# Patient Record
Sex: MALE | Race: WHITE
[De-identification: names, ages, dates, MRNs, and addresses within clinical notes are randomized per-mention and may not be internally consistent; named-entity substitution may affect disease eponyms.]

---

## 2019-01-20 ENCOUNTER — HOSPITAL ENCOUNTER (EMERGENCY)
Dept: HOSPITAL 33 - ED | Age: 24
Discharge: ANOTHER HOSPITAL | End: 2019-01-20
Payer: MEDICAID

## 2019-01-20 VITALS — OXYGEN SATURATION: 98 % | HEART RATE: 98 BPM | DIASTOLIC BLOOD PRESSURE: 69 MMHG | SYSTOLIC BLOOD PRESSURE: 99 MMHG

## 2019-01-20 DIAGNOSIS — J39.0: ICD-10-CM

## 2019-01-20 DIAGNOSIS — K04.7: Primary | ICD-10-CM

## 2019-01-20 DIAGNOSIS — L03.211: ICD-10-CM

## 2019-01-20 DIAGNOSIS — Z72.0: ICD-10-CM

## 2019-01-20 LAB
ALBUMIN SERPL-MCNC: 4.1 G/DL (ref 3.5–5)
ALP SERPL-CCNC: 124 U/L (ref 38–126)
ALT SERPL-CCNC: 35 U/L (ref 0–50)
ANION GAP SERPL CALC-SCNC: 13.6 MEQ/L (ref 5–15)
AST SERPL QL: 29 U/L (ref 17–59)
BASOPHILS # BLD AUTO: 0.02 10*3/UL (ref 0–0.4)
BASOPHILS NFR BLD AUTO: 0.2 % (ref 0–0.4)
BILIRUB BLD-MCNC: 0.8 MG/DL (ref 0.2–1.3)
BUN SERPL-MCNC: 23 MG/DL (ref 9–20)
CALCIUM SPEC-MCNC: 9.3 MG/DL (ref 8.4–10.2)
CHLORIDE SERPL-SCNC: 104 MMOL/L (ref 98–107)
CO2 SERPL-SCNC: 26 MMOL/L (ref 22–30)
CREAT SERPL-MCNC: 0.76 MG/DL (ref 0.66–1.25)
EOSINOPHIL # BLD AUTO: 0.05 10*3/UL (ref 0–0.5)
GLUCOSE SERPL-MCNC: 110 MG/DL (ref 74–106)
HCT VFR BLD AUTO: 45.8 % (ref 42–50)
HGB BLD-MCNC: 15 GM/DL (ref 12.5–18)
LYMPHOCYTES # SPEC AUTO: 1.28 10*3/UL (ref 1–4.6)
MCH RBC QN AUTO: 30.9 PG (ref 26–32)
MCHC RBC AUTO-ENTMCNC: 32.8 G/DL (ref 32–36)
MONOCYTES # BLD AUTO: 1.44 10*3/UL (ref 0–1.3)
NEUTROPHILS NFR BLD AUTO: 78.2 % (ref 36–66)
PLATELET # BLD AUTO: 214 K/MM3 (ref 150–450)
POTASSIUM SERPLBLD-SCNC: 4 MMOL/L (ref 3.5–5.1)
PROT SERPL-MCNC: 7.1 G/DL (ref 6.3–8.2)
RBC # BLD AUTO: 4.86 M/MM3 (ref 4.1–5.6)
SODIUM SERPL-SCNC: 139 MMOL/L (ref 137–145)
WBC # BLD AUTO: 12.9 K/MM3 (ref 4–10.5)

## 2019-01-20 PROCEDURE — 96360 HYDRATION IV INFUSION INIT: CPT

## 2019-01-20 PROCEDURE — 99285 EMERGENCY DEPT VISIT HI MDM: CPT

## 2019-01-20 PROCEDURE — 70486 CT MAXILLOFACIAL W/O DYE: CPT

## 2019-01-20 PROCEDURE — 36000 PLACE NEEDLE IN VEIN: CPT

## 2019-01-20 PROCEDURE — 85025 COMPLETE CBC W/AUTO DIFF WBC: CPT

## 2019-01-20 PROCEDURE — 96367 TX/PROPH/DG ADDL SEQ IV INF: CPT

## 2019-01-20 PROCEDURE — 96374 THER/PROPH/DIAG INJ IV PUSH: CPT

## 2019-01-20 PROCEDURE — 36415 COLL VENOUS BLD VENIPUNCTURE: CPT

## 2019-01-20 PROCEDURE — 80053 COMPREHEN METABOLIC PANEL: CPT

## 2019-01-20 PROCEDURE — 70490 CT SOFT TISSUE NECK W/O DYE: CPT

## 2019-01-20 PROCEDURE — 96365 THER/PROPH/DIAG IV INF INIT: CPT

## 2019-01-20 PROCEDURE — 96375 TX/PRO/DX INJ NEW DRUG ADDON: CPT

## 2019-01-20 NOTE — XRAY
Indication: Mouth pain and swelling.  Possible dental abscess.



Multiple contiguous axial images obtained through the facial bones without

contrast as ordered.  Sagittal and coronal reformatted images obtained.

Cutaneous marker placed over the region of interest.



Comparison: None



Cutaneous marker seen overlying right jaw where there is underlying soft

tissue swelling/edema favoring underlying inflammatory/infectious process.

Larger focus of deep right masseter space, floor of the right mouth, and right

paraesophageal soft tissue swelling/edema narrows the laryngopharynx and

oropharynx.  Lack of IV contrast precludes further characterization and

therefore abscesses not completely excluded.  Underlying prominent reactive

right submandibular and right cervical lymphadenopathy, largest 1.1 x 2.2 cm

adjacent to the carotid bulb.  Also enlarged right submandibular gland.  No

focal fluid collection or air bubbles to suggest abscess.



Elsewhere no acute fracture, suspicious bony lesions, or osseous destructive

process.  Incidental multiple bilateral dental caries, largest focus right

lower molar tooth.  Orbits including roof, walls, and floors are intact.

Floor of the maxillary sinuses demonstrates minimal mucosal thickening without

fluid leveling.  Moderate nasal septal deviation to the left.  Remaining

visualized noncontrasted soft tissues including base of the brain

unremarkable.  Visualized cervical spine intact.



Impression:

1.  Right mandible, right deep masseter space, floor of the right mouth, and

right paraesophageal soft tissue swelling/edema favoring underlying

inflammatory/infectious process with reactive submandibular and cervical

lymphadenopathy.  Abscesses not completely excluded on this noncontrast exam.

2.  Multiple bilateral dental caries.

3.  Minimal paranasal sinus disease.



Comment: Preliminary interpretation was made by [facility].  No critical discrepancy.



CTDI 59.47

## 2019-01-20 NOTE — ERPHSYRPT
- History of Present Illness


Source: patient, family


Exam Limitations: no limitations


Patient Subjective Stated Complaint: mom states pt has had swelling in rt jaw 

since tuesday and has a broken tooth on that side


Triage Nursing Assessment: pt awake and alert, age approp behavior. pt 

ambulatory  with steady gait noted. respirations nonlabored with lungs cta. pt 

states he has difficulty swallowing d/t swelling in rt lower jaw. swelling to 

rt lower jaw extending into rt neck. no tenderness noted to light palpation. 

broken tooth noted to rt lower jaw.


Timing/Duration: gradual onset, days


Severity: severe


ENT Location: mouth, dental


Prearrival Treatment: prescription meds


Modifying Factors: Improves With: nothing


Associated Symptoms: facial pain/swelling, jaw pain, poor solids intake, sore 

throat, tooth pain


Hx Tetanus, Diphtheria Vaccination/Date Given: Yes


Hx Influenza Vaccination/Date Given: No


Hx Pneumococcal Vaccination/Date Given: No


Immunizations Up to Date: Yes





<ISRAEL JAUREGUI - Last Filed: 01/20/19 07:07>





<ANASTASIYA GONZALEZ - Last Filed: 01/20/19 12:46>





- History of Present Illness


Time Seen by Provider: 01/20/19 06:54


Physician History: 





pt is 23 year old male with right lower dental pain and swelling  x 5 days 

treated already with amoxicillin and with gradually increasing swelling over 

that time now with throat pain , but still able to swallow and no trouble 

breathing;  there is swelling now in the lower jaw anterior but not yet all the 

way to trachea;  the pt cannot open jaw all the way; tender right lower molar 

reproduces pain exactly;   (ISRAEL JAUREGUI)


Allergies/Adverse Reactions: 








No Known Drug Allergies Allergy (Verified 01/20/19 06:44)


 





Home Medications: 








No Reportable Medications [No Reported Medications]  01/20/19 [History]








- Review of Systems


Constitutional: No Fever, No Chills


Eyes: No Symptoms


Ears, Nose, & Throat: Throat Pain, Painful Swallowing, Other (dental pain)


Respiratory: No Cough, No Dyspnea


Cardiac: No Chest Pain, No Edema, No Syncope


Abdominal/Gastrointestinal: No Abdominal Pain, No Nausea, No Vomiting, No 

Diarrhea


Genitourinary Symptoms: No Dysuria


Musculoskeletal: No Back Pain, No Neck Pain


Skin: No Rash


Neurological: No Dizziness, No Focal Weakness, No Sensory Changes


Psychological: No Symptoms


Endocrine: No Symptoms


All Other Systems: Reviewed and Negative





<ISRAEL JAUREGUI - Last Filed: 01/20/19 07:07>





- Past Medical History


Pertinent Past Medical History: No





- Past Surgical History


Past Surgical History: Yes


Other Surgical History: tubes in ears as a child





- Social History


Smoking Status: Current every day smoker


How long have you smoked: 6 yrs


Exposure to second hand smoke: Yes


Drug Use: none


Patient Lives Alone: No





<ISRAEL JAUREGUI - Last Filed: 01/20/19 07:07>





- Physical Exam


General Appearance: no apparent distress, alert


Eye Exam: bilateral eye: PERRL, EOMI


Ear Exam: bilateral ear: auricle normal, canal normal, TM normal


Nasal Exam: normal inspection


Throat Exam: pharynx normal, dental tenderness, mandibular swelling, moist 

mucus membranes, No excessive drooling, No tonsillar exudate


Neck Exam: trachea midline, lymphadenopathy (R), No meningismus


Cardiovascular/Respiratory Exam: normal breath sounds, regular rate/rhythm


Abdominal Exam: non-tender, soft


Neurologic Exam: alert, oriented x 3, sensation nml, No motor deficits


Skin Exam: normal color, warm, dry


SpO2: 98





<ISRAEL JAUREGUI - Last Filed: 01/20/19 07:07>





- Nursing Vital Signs


Nursing Vital Signs: 


 Initial Vital Signs











Temperature  99.4 F   01/20/19 06:33


 


Pulse Rate  93 H  01/20/19 06:33


 


Respiratory Rate  18   01/20/19 06:33


 


Blood Pressure  135/89   01/20/19 06:33


 


O2 Sat by Pulse Oximetry  98   01/20/19 06:33








 Pain Scale











Pain Intensity                 4

















- Course


Nursing assessment & vital signs reviewed: Yes





<ISRAEL JAUREGUI - Last Filed: 01/20/19 07:07>


Ordered Tests: 


 Active Orders 24 hr











 Category Date Time Status


 


 IV Insertion STAT Care  01/20/19 07:03 Active


 


 Pulse Oximetry (ED) STAT Care  01/20/19 07:03 Active


 


 FACIAL BONES WO CONTRAST [CT] Stat Exams  01/20/19 07:02 Completed


 


 NECK WO CONTRAST [CT] Stat Exams  01/20/19 07:02 Completed


 


 CBC W DIFF Stat Lab  01/20/19 07:00 Completed


 


 CMP Stat Lab  01/20/19 07:00 Completed








Medication Summary














Discontinued Medications














Generic Name Dose Route Start Last Admin





  Trade Name Freq  PRN Reason Stop Dose Admin


 


Dexamethasone Sodium Phosphate  10 mg  01/20/19 07:05  01/20/19 07:40





  Decadron 10mg Inj.  IV  01/20/19 07:06  10 mg





  STAT ONE   Administration





     





     





     





     


 


Dexamethasone Sodium Phosphate  Confirm  01/20/19 07:19  





  Decadron 10mg Inj.  Administered  01/20/19 07:20  





  Dose   





  10 mg   





  .ROUTE   





  .STK-MED ONE   





     





     





     





     


 


Sodium Chloride  1,000 mls @ 999 mls/hr  01/20/19 07:03  01/20/19 10:01





  Sodium Chloride 0.9% 1000 Ml  IV  01/20/19 08:03  Infused





  .Q1H1M STA   Infusion





     





     





     





     


 


Ceftriaxone Sodium/Dextrose  1 g in 50 mls @ 100 mls/hr  01/20/19 07:04  01/20/ 19 10:02





  Rocephin 1 Gm-D5w 50 Ml Bag**  IV  01/20/19 07:33  Infused





  STAT STA   Infusion





     





     





     





     


 


Clindamycin HCl/Dextrose  600 mg in 50 mls @ 100 mls/hr  01/20/19 07:06  01/20/ 19 10:02





  Clindamycin-D5w 600 Mg/50 Ml***  IV  01/20/19 07:35  Infused





  STAT STA   Infusion





     





     





     





     


 


Ampicillin Sodium/Sulbactam Sodium  1.5 gm in 100 mls @ 200 mls/hr  01/20/19 07:

06  01/20/19 10:02





  Unasyn 1.5gm / Nacl 100ml  IV  01/20/19 07:35  Infused





  STAT STA   Infusion





     





     





     





     


 


Sodium Chloride  Confirm  01/20/19 07:19  





  Sodium Chloride 0.9% 1000 Ml  Administered  01/20/19 07:20  





  Dose   





  1,000 mls @ ud   





  .ROUTE   





  .STK-MED ONE   





     





     





     





     


 


Ceftriaxone Sodium/Dextrose  Confirm  01/20/19 07:19  





  Rocephin 1 Gm-D5w 50 Ml Bag**  Administered  01/20/19 07:20  





  Dose   





  1 g in 50 mls @ ud   





  IV   





  .STK-MED ONE   





     





     





     





     


 


Ampicillin Sodium/Sulbactam Sodium  Confirm  01/20/19 07:19  





  Unasyn 1.5gm / Nacl 100ml  Administered  01/20/19 07:20  





  Dose   





  1.5 gm in 100 mls @ ud   





  .ROUTE   





  .STK-MED ONE   





     





     





     





     


 


Clindamycin HCl/Dextrose  Confirm  01/20/19 07:20  





  Clindamycin-D5w 600 Mg/50 Ml***  Administered  01/20/19 07:21  





  Dose   





  600 mg in 50 mls @ ud   





  IV   





  .STK-MED ONE   





     





     





     





     


 


Ketorolac Tromethamine  30 mg  01/20/19 07:56  01/20/19 08:35





  Toradol 30 Mg Injection***  IV  01/20/19 07:57  30 mg





  STAT ONE   Administration





     





     





     





     


 


Ketorolac Tromethamine  Confirm  01/20/19 08:32  





  Toradol 30 Mg Injection***  Administered  01/20/19 08:33  





  Dose   





  30 mg   





  .ROUTE   





  .STK-MED ONE   





     





     





     





     











Lab/Rad Data: 


 Laboratory Result Diagrams





 01/20/19 07:00 





 01/20/19 07:00 





 Laboratory Results











  01/20/19 01/20/19 Range/Units





  07:00 07:00 


 


WBC   12.9 H  (4.0-10.5)  K/mm3


 


RBC   4.86  (4.1-5.6)  M/mm3


 


Hgb   15.0  (12.5-18.0)  gm/dl


 


Hct   45.8  (42-50)  %


 


MCV   94.2  ()  fl


 


MCH   30.9  (26-32)  pg


 


MCHC   32.8  (32-36)  g/dl


 


RDW   13.7  (11.5-14.0)  %


 


Plt Count   214  (150-450)  K/mm3


 


MPV   10.8 H  (6-9.5)  fl


 


Gran %   78.2 H  (36.0-66.0)  %


 


Eos # (Auto)   0.05  (0-0.5)  


 


Absolute Lymphs (auto)   1.28  (1.0-4.6)  


 


Absolute Monos (auto)   1.44 H  (0.0-1.3)  


 


Lymphocytes %   10.0 L  (24.0-44.0)  %


 


Monocytes %   11.2  (0.0-12.0)  %


 


Eosinophils %   0.4  (0.00-5.0)  %


 


Basophils %   0.2  (0.0-0.4)  %


 


Absolute Granulocytes   10.07 H  (1.4-6.9)  


 


Basophils #   0.02  (0-0.4)  


 


Sodium  139   (137-145)  mmol/L


 


Potassium  4.0   (3.5-5.1)  mmol/L


 


Chloride  104   ()  mmol/L


 


Carbon Dioxide  26   (22-30)  mmol/L


 


Anion Gap  13.6   (5-15)  MEQ/L


 


BUN  23 H   (9-20)  mg/dL


 


Creatinine  0.76   (0.66-1.25)  mg/dL


 


Estimated GFR  > 60.0   ML/MIN


 


Glucose  110 H   ()  mg/dL


 


Calcium  9.3   (8.4-10.2)  mg/dL


 


Total Bilirubin  0.80   (0.2-1.3)  mg/dL


 


AST  29   (17-59)  U/L


 


ALT  35   (0-50)  U/L


 


Alkaline Phosphatase  124   ()  U/L


 


Serum Total Protein  7.1   (6.3-8.2)  g/dL


 


Albumin  4.1   (3.5-5.0)  g/dL














- Progress


Progress: re-examined


Counseled pt/family regarding: lab results, diagnosis, need for follow-up, rad 

results





<ISRAEL JAUREGUI - Last Filed: 01/20/19 07:07>





<ANASTASIYA GONZALEZ - Last Filed: 01/20/19 12:46>





- Progress


Progress Note: 





01/20/19 07:07


pt handed over to Dr. Gonzalez at change of shift for final disposition / Tx 

after discussion of pending testing , potential diagnosis , and emergent [facility] 

referral/eval. (ISRAEL JAUREGUI)








01/20/19 08:58


23-year-old white male arrives with complaint of pain in the right mandibular 

area swelling in the right side of his jaw and neck symptoms for 5 days 

apparently had been taking Augmentin.


Patient is initially seen by Dr. Jauregui.


Patient noted to have marked edema to the right side of his jaw and on the 

right side of his neck he is unable to fully open his mouth.


Is not having problems breathing.





CT of the face and a CT of the soft tissue neck remarkable for significant/

severe soft tissue edema in the right side of the floor the mouth extending 

around the right side of the mandible into the parapharyngeal space.  Aches 

this exerts mass effect on the right side of the upper airway at the level of 

the laryngeal pharynx and oral pharynx.  This originates from dental abscesses 

associated with right mandibular molars the left side of floor the mouth 

appears relatively spared suggesting against fluid legs angina there does not 

appear to be a drainable abscess adjacent to the mandibular teeth.  2.  There 

is a moderate size retropharyngeal effusion likely reactive to extensive 

infection there is also soft tissue edema extending along the right and 

anterior aspect of the neck to the level of the manubrium consistent with 

cellulitis








Patient was initially seen by Dr. Jauregui he prescribed clindamycin, ampicillin, 

Decadron, patient is also received Toradol 30 mg IV patient's CBC is remarkable 

for white count 12.9 hemoglobin 15 hematocrit 45.8 platelets are 214 patient's 

chemistries sodium 139 potassium 4.0 chloride 105 bicarbonate 26 BUN 23 

creatinine 0.76 glucose is 110





Patient's vitals were stable


Pulse ox is 98%





I contacted [facility] they were unable to take 

care of this patient.


I did contact [facility] one call and Dr. Homer May


Has a requested that the patient be sent to [facility] ER.


Will plan to transfer patient by [facility] ambulance.








 (ANASTASIYA GONZALEZ)





<ISRAEL JAUREGUI - Last Filed: 01/20/19 07:07>





- Departure


Time of Disposition: 08:58


[facility]


Critical Care Time: No





<ANASTASIYA GONZALEZ - Last Filed: 01/20/19 12:46>





- Departure


Clinical Impression: 


 Dental abscess, Facial cellulitis, retropharyngeal effusion





Condition: Fair


Referrals: 


DOCTOR,NO FAMILY [Primary Care Provider] -

## 2019-01-20 NOTE — XRAY
Indication: Mouth pain and swelling.  Possible dental abscess.



Multiple contiguous axial images obtained through the neck without contrast as

ordered.  Sagittal and coronal reformatted images obtained.  Cutaneous marker

placed over the region of interest.



Comparison: None



Cutaneous marker seen overlying right jaw where there is underlying soft

tissue swelling/edema favoring underlying inflammatory/infectious process.

Larger focus of deep right masseter space, floor of the right mouth, and right

paraesophageal soft tissue swelling/edema narrows the laryngopharynx and

oropharynx.  Lack of IV contrast precludes further characterization and

therefore abscesses not completely excluded.  Normal epiglottis.  Underlying

prominent reactive right submandibular and right cervical lymphadenopathy,

largest 1.1 x 2.2 cm adjacent to the carotid bulb.  Also enlarged right

submandibular gland.  No focal fluid collection or air bubbles to suggest

abscess.  No focal thyroid solid/cystic mass.



Elsewhere no acute fracture, suspicious bony lesions, or osseous destructive

process.  Incidental multiple bilateral dental caries, largest focus right

lower molar tooth.  Floor of the maxillary sinuses demonstrates minimal

mucosal thickening without fluid leveling.  Visualized cervical spine intact

with cervical lordotic reversal.  Visualized brain and lung apices

unremarkable.



Impression:

1.  Right mandible, right deep masseter space, floor of the right mouth, and

right paraesophageal soft tissue swelling/edema favoring underlying

inflammatory/infectious process with reactive submandibular and cervical

lymphadenopathy.  Abscesses not completely excluded on this noncontrast exam.

2.  Multiple bilateral dental caries.

3.  Minimal paranasal sinus disease.



Comment: Preliminary interpretation was made by [facility].  No critical discrepancy.



CTDI 14.67